# Patient Record
Sex: MALE | Race: WHITE | NOT HISPANIC OR LATINO | Employment: FULL TIME | URBAN - METROPOLITAN AREA
[De-identification: names, ages, dates, MRNs, and addresses within clinical notes are randomized per-mention and may not be internally consistent; named-entity substitution may affect disease eponyms.]

---

## 2017-08-11 ENCOUNTER — HOSPITAL ENCOUNTER (EMERGENCY)
Facility: HOSPITAL | Age: 27
Discharge: HOME | End: 2017-08-11
Payer: MEDICAID

## 2017-08-11 VITALS
SYSTOLIC BLOOD PRESSURE: 174 MMHG | DIASTOLIC BLOOD PRESSURE: 104 MMHG | HEIGHT: 68 IN | RESPIRATION RATE: 18 BRPM | OXYGEN SATURATION: 100 % | TEMPERATURE: 98 F | HEART RATE: 76 BPM | BODY MASS INDEX: 25.01 KG/M2 | WEIGHT: 165 LBS

## 2017-08-11 DIAGNOSIS — S20.219A CHEST WALL CONTUSION: ICD-10-CM

## 2017-08-11 DIAGNOSIS — S20.212A CHEST WALL CONTUSION, LEFT, INITIAL ENCOUNTER: ICD-10-CM

## 2017-08-11 PROCEDURE — 93010 ELECTROCARDIOGRAM REPORT: CPT | Mod: ,,, | Performed by: INTERNAL MEDICINE

## 2017-08-11 PROCEDURE — 99284 EMERGENCY DEPT VISIT MOD MDM: CPT | Mod: 25

## 2017-08-11 PROCEDURE — 25000003 PHARM REV CODE 250: Performed by: PHYSICIAN ASSISTANT

## 2017-08-11 PROCEDURE — 93005 ELECTROCARDIOGRAM TRACING: CPT

## 2017-08-11 RX ORDER — HYDROCODONE BITARTRATE AND ACETAMINOPHEN 7.5; 325 MG/1; MG/1
1 TABLET ORAL EVERY 6 HOURS PRN
Qty: 10 TABLET | Refills: 0 | Status: SHIPPED | OUTPATIENT
Start: 2017-08-11

## 2017-08-11 RX ORDER — NAPROXEN 500 MG/1
500 TABLET ORAL 2 TIMES DAILY WITH MEALS
Qty: 12 TABLET | Refills: 0 | Status: SHIPPED | OUTPATIENT
Start: 2017-08-11

## 2017-08-11 RX ORDER — INSULIN GLARGINE 100 [IU]/ML
INJECTION, SOLUTION SUBCUTANEOUS NIGHTLY
COMMUNITY

## 2017-08-11 RX ORDER — INSULIN ASPART 100 [IU]/ML
INJECTION, SOLUTION INTRAVENOUS; SUBCUTANEOUS
COMMUNITY

## 2017-08-11 RX ORDER — HYDROCODONE BITARTRATE AND ACETAMINOPHEN 10; 325 MG/1; MG/1
1 TABLET ORAL
Status: COMPLETED | OUTPATIENT
Start: 2017-08-11 | End: 2017-08-11

## 2017-08-11 RX ADMIN — HYDROCODONE BITARTRATE AND ACETAMINOPHEN 1 TABLET: 10; 325 TABLET ORAL at 04:08

## 2017-08-11 NOTE — ED PROVIDER NOTES
SCRIBE #1 NOTE: I, Sathish Uribe, am scribing for, and in the presence of, RICKY Calloway. I have scribed the entire note.      History      Chief Complaint   Patient presents with    Chest wall pain     pt c/o L chest wall pain after getting hit in the chest by another player's shoulder playing basketball on Monday       Review of patient's allergies indicates:   Allergen Reactions    Oyster extract         HPI   HPI    8/11/2017, 3:34 PM   History obtained from the patient      History of Present Illness: Wilmer Natarajan is a 27 y.o. male patient who presents to the Emergency Department for left-sided chest pain which onset gradually x4 days ago. Pt reports he hit hard in his chest by another player during a basketball game. Symptoms are constant and moderate in severity. Exacerbated by breathing and relieved by nothing. Associated sxs include SOB. Patient denies any diaphoresis, palpitations, extremity weakness/numbness, leg pain/swelling, dizziness, cough, n/v, and all other sxs at this time. No further complaints or concerns at this time.     Arrival mode: Personal vehicle    PCP: No primary care provider on file.       Past Medical History:  Past medical history reviewed not relevant      Past Surgical History:  Past surgical history reviewed not relevant      Family History:  Family history reviewed not relevant      Social History:  Social History    Social History Main Topics    Social History Main Topics    Smoking status: Unknown if ever smoked    Smokeless tobacco: Unknown if ever used    Alcohol Use: Unknown drinking history    Drug Use: Unknown if ever used    Sexual Activity: Unknown         ROS   Review of Systems   Constitutional: Negative for chills, diaphoresis and fever.   HENT: Negative for sore throat and trouble swallowing.    Respiratory: Positive for shortness of breath. Negative for cough, chest tightness and wheezing.    Cardiovascular: Positive for chest pain. Negative for  "palpitations and leg swelling (Left sided).   Gastrointestinal: Negative for abdominal pain, diarrhea, nausea and vomiting.   Genitourinary: Negative for decreased urine volume, difficulty urinating, dysuria, frequency, hematuria and urgency.   Musculoskeletal: Negative for back pain, neck pain and neck stiffness.   Skin: Negative for rash.   Neurological: Negative for dizziness, weakness, light-headedness and headaches.   Hematological: Does not bruise/bleed easily.     Physical Exam      Initial Vitals [08/11/17 1524]   BP Pulse Resp Temp SpO2   (!) 174/104 76 18 98 °F (36.7 °C) 100 %      MAP       127.33          Physical Exam  Nursing Notes and Vital Signs Reviewed.  Constitutional: Patient is in no acute distress. Well-developed and well-nourished.  Head: Atraumatic. Normocephalic.  Eyes: PERRL. EOM intact. Conjunctivae are not pale. No scleral icterus.  ENT: Mucous membranes are moist. Oropharynx is clear and symmetric.    Neck: Supple. Full ROM. No lymphadenopathy.  Cardiovascular: Regular rate. Regular rhythm. No murmurs, rubs, or gallops. Distal pulses are 2+ and symmetric.  Pulmonary/Chest: No respiratory distress. Clear to auscultation bilaterally. No wheezing, rales, or rhonchi. Left anterior CW tenderness, just lateral to sternum, with point tenderness. No step or crep.  Abdominal: Soft and non-distended.  There is no tenderness.   Musculoskeletal: Moves all extremities. No obvious deformities. No edema. No calf tenderness.  Skin: Warm and dry.  Neurological:  Alert, awake, and appropriate.  Normal speech.  No acute focal neurological deficits are appreciated.  Psychiatric: Normal affect. Good eye contact. Appropriate in content.    ED Course    Procedures  ED Vital Signs:  Vitals:    08/11/17 1524   BP: (!) 174/104   Pulse: 76   Resp: 18   Temp: 98 °F (36.7 °C)   TempSrc: Oral   SpO2: 100%   Weight: 74.8 kg (165 lb)   Height: 5' 8" (1.727 m)       Abnormal Lab Results:  Labs Reviewed - No data to " display     All Lab Results:  Results for orders placed or performed during the hospital encounter of 09/12/10   Acetone   Result Value Ref Range    Acetone, Bld Negative Negative   CBC auto differential   Result Value Ref Range    WBC 9.70 4.8 - 10.8 K/uL    RBC 5.45 4.60 - 6.20 M/uL    Hemoglobin 16.4 14.0 - 18.0 gm/dl    Hematocrit 46.0 40.0 - 54.0 %    MCV 84.4 82 - 95 fL    MCH 30.1 27 - 31 pg    MCHC 35.6 32 - 36 %    RDW 12.7 11.5 - 14.5 %    Gran% 60.9 38 - 73 %    Lymph% 33.4 21 - 44 %    Mono% 4.3 0.0 - 7.4 %    Eosinophil% 0.9 0.0 - 4.2 %    Basophil% 0.5 0 - 1.9 %    Gran # 5.9 1.8 - 7.7 K/uL    Lymph # 3.3 1 - 4.8 K/uL    Mono # 0.4 0.0 - 0.8 K/uL    Eos # 0.1 0 - 0.45 K/uL    Baso # 0.1 0.0 - 0.2 K/uL    Platelets 252 150 - 350 K/uL    MPV 9.3 9.2 - 12.9 fL   Amylase   Result Value Ref Range    Amylase 29 20 - 110 U/L   Lipase ([facility])   Result Value Ref Range    Lipase 176 114 - 286 U/L   Ethanol   Result Value Ref Range    Alcohol, Medical, Serum 175 (H) 0 - 13 mg/dl   Comprehensive metabolic panel   Result Value Ref Range    Glucose 278 (H) 70 - 110 mg/dl    BUN, Bld 9 6 - 20 mg/dl    Creatinine 0.9 0.5 - 1.4 mg/dl    Calcium 9.5 8.7 - 10.5 mg/dl    Sodium 142 136 - 145 mMol/l    Potassium 3.4 (L) 3.5 - 5.1 mMol/l    Chloride 105 95 - 110 mMol/l    Total Protein 7.7 6.0 - 8.4 gm/dl    Albumin 4.2 3.5 - 5.2 g/dl    Total Bilirubin 0.3 0.1 - 1.0 mg/dl    AST 19 10 - 40 U/L    Alkaline Phosphatase 97 55 - 135 U/L    CO2 23.1 23.0 - 29.0 mEq/L    ALT 36 10 - 44 U/L    Anion Gap 18 10 - 20 mmol/L    eGFR if non African American >60 >60 mL/min    eGFR if African American >60 >60 mL/min         Imaging Results:  Imaging Results          X-Ray Chest 1 View (Final result)  Result time 08/11/17 16:02:06    Final result by Emile Peterson MD (08/11/17 16:02:06)                 Impression:     No acute cardiopulmonary disease.            Electronically signed by: EMILE PETERSON MD  Date:     08/11/17  Time:    16:02               Narrative:    Exam: XR CHEST 1 VIEW    Clinical History: Chest wall pain status post trauma     Findings:     The lungs are clear. The cardiac silhouette is within normal limits.                             X-Ray Ribs 2 View Left (Final result)  Result time 08/11/17 16:02:38    Final result by Emile Peterson MD (08/11/17 16:02:38)                 Impression:         Negative.      Electronically signed by: EMILE PETERSON MD  Date:     08/11/17  Time:    16:02              Narrative:    Exam: RIBS, 4 views with PA chest.    Clinical History:    Acute left chest pain.  Initial encounter.      Findings:      No acute rib fracture identified. No pneumothorax.                             X-Ray Sternum (Final result)  Result time 08/11/17 16:03:05    Final result by Emile Peterson MD (08/11/17 16:03:05)                 Impression:         Negative.        Electronically signed by: EMILE PETERSON MD  Date:     08/11/17  Time:    16:03              Narrative:    Exam: XR STERNUM PA AND LATERAL    Clinical History:    Acute chest pain. Initial encounter.    Findings:      No osseous, articular, or soft tissue abnormality demonstrated.                                  The EKG was ordered, reviewed, and independently interpreted by the ED provider.  Interpretation time: 16:21  Rate: 66 BPM  Rhythm: normal sinus rhythm  Interpretation: No STEMI.         The Emergency Provider reviewed the vital signs and test results, which are outlined above.    ED Discussion     4:37 PM: Re-evaluated pt. Pt is resting comfortably and is in no acute distress.  Pt states that he is feeling much better.  D/w pt all pertinent results. D/w pt any concerns expressed at this time. Answered all questions. Pt expresses understanding at this time.    4:48 PM: Reassessed pt at this time. Pt is awake, alert, and in NAD. Pt states his condition has improved at this time. Discussed with pt all pertinent ED information and results. Discussed pt dx  and plan of tx. Gave pt all f/u and return to the ED instructions. All questions and concerns were addressed at this time. Pt expresses understanding of information and instructions, and is comfortable with plan to discharge. Pt is stable for discharge.    I discussed with patient and/or family/caretaker that evaluation in the ED does not suggest any emergent or life threatening medical conditions requiring immediate intervention beyond what was provided in the ED, and I believe patient is safe for discharge.  Regardless, an unremarkable evaluation in the ED does not preclude the development or presence of a serious of life threatening condition. As such, patient was instructed to return immediately for any worsening or change in current symptoms.      ED Medication(s):  Medications   hydrocodone-acetaminophen 10-325mg per tablet 1 tablet (1 tablet Oral Given 8/11/17 2258)       Discharge Medication List as of 8/11/2017  4:47 PM      START taking these medications    Details   hydrocodone-acetaminophen 7.5-325mg (NORCO) 7.5-325 mg per tablet Take 1 tablet by mouth every 6 (six) hours as needed for Pain., Starting Fri 8/11/2017, Print      naproxen (NAPROSYN) 500 MG tablet Take 1 tablet (500 mg total) by mouth 2 (two) times daily with meals., Starting Fri 8/11/2017, Print             Follow-up Information     [facility] in 2 days.    Contact information:  [address] [facility] [address] [phone]             [facility]    Specialty:  Emergency Medicine  Why:  If symptoms worsen  Contact information:  [address] [facility] [address] [phone]                   Medical Decision Making    Medical Decision Making:   Clinical Tests:   Lab Tests: Ordered and Reviewed  Radiological Study: Ordered and Reviewed  Medical Tests: Ordered and Reviewed           Scribe Attestation:   Scribe #1: I performed the above scribed service and the documentation  accurately describes the services I performed. I attest to the accuracy of the note.    Attending:   Physician Attestation Statement for Scribe #1: I, RICKY Calloway, personally performed the services described in this documentation, as scribed by Sathish Uribe, in my presence, and it is both accurate and complete.          Clinical Impression       ICD-10-CM ICD-9-CM   1. Chest wall contusion S20.219A 922.1   2. Chest wall contusion, left, initial encounter S20.212A 922.1   3. Chest wall contusion S20.219A 922.1       Disposition:   Disposition: Discharged  Condition: Stable         Sandi Donato PA-C  08/12/17 2007

## 2018-02-09 ENCOUNTER — HOSPITAL ENCOUNTER (EMERGENCY)
Facility: HOSPITAL | Age: 28
Discharge: LEFT BEFORE DISCHARGE | End: 2018-02-09
Payer: MEDICAID

## 2018-02-09 VITALS
TEMPERATURE: 99 F | DIASTOLIC BLOOD PRESSURE: 94 MMHG | SYSTOLIC BLOOD PRESSURE: 156 MMHG | WEIGHT: 151 LBS | OXYGEN SATURATION: 100 % | HEART RATE: 88 BPM | RESPIRATION RATE: 18 BRPM | HEIGHT: 68 IN | BODY MASS INDEX: 22.88 KG/M2

## 2018-02-09 DIAGNOSIS — M25.473 ANKLE SWELLING: ICD-10-CM

## 2018-02-09 PROCEDURE — 99282 EMERGENCY DEPT VISIT SF MDM: CPT

## 2018-02-09 RX ORDER — IBUPROFEN 600 MG/1
600 TABLET ORAL
Status: DISCONTINUED | OUTPATIENT
Start: 2018-02-09 | End: 2018-02-09 | Stop reason: HOSPADM

## 2018-02-09 RX ORDER — LISINOPRIL 20 MG/1
20 TABLET ORAL
Status: DISCONTINUED | OUTPATIENT
Start: 2018-02-09 | End: 2018-02-09 | Stop reason: HOSPADM

## 2018-02-09 NOTE — ED PROVIDER NOTES
Encounter Date: 2/9/2018       History     Chief Complaint   Patient presents with    Joint Swelling     pt reports sudden swelling of L ankle denies injury      Patient reports that his ankle began swelling yesterday. Denies any injury. C/o minimal pain. Also c/o HA which began yesterday. Reports that he has been stressed and has not been taking lisinopril d/t problems with insurance. Has not tried any OTC medications. Denies SOB, CP, N/V/D, and all other symptoms at this time.         Foot Injury    The incident occurred at home. There was no injury mechanism. The incident occurred yesterday. The pain is present in the left ankle. The quality of the pain is described as aching. Pertinent negatives include no numbness, no inability to bear weight, no loss of motion, no muscle weakness, no loss of sensation and no tingling. He reports no foreign bodies present. Nothing aggravates the symptoms. He has tried nothing for the symptoms.     Review of patient's allergies indicates:   Allergen Reactions    Oyster extract      Past Medical History:   Diagnosis Date    Diabetes mellitus      No past surgical history on file.  No family history on file.  Social History   Substance Use Topics    Smoking status: Not on file    Smokeless tobacco: Not on file    Alcohol use Not on file     Review of Systems   Neurological: Negative for tingling and numbness.       Physical Exam     Initial Vitals [02/09/18 1355]   BP Pulse Resp Temp SpO2   (!) 156/94 88 18 98.6 °F (37 °C) 100 %      MAP       114.67         Physical Exam    ED Course   Procedures  Labs Reviewed - No data to display                            ED Course      Clinical Impression:   The encounter diagnosis was Ankle swelling.    Disposition:   Eloped: Patient eloped after MD assigned, HPI and exam. Patient status: competent, oriented x 3 and not intoxicated. Patient left: not witnessed. Patient's IV: no IV.                         Gisselle Lofton  RUDI  02/09/18 1604

## 2018-02-14 NOTE — ED NOTES
Eloped status noted - chart and provider note reviewed. No further actions indicated or taken at this time.